# Patient Record
Sex: FEMALE | Race: WHITE | Employment: FULL TIME | URBAN - METROPOLITAN AREA
[De-identification: names, ages, dates, MRNs, and addresses within clinical notes are randomized per-mention and may not be internally consistent; named-entity substitution may affect disease eponyms.]

---

## 2017-04-06 ENCOUNTER — OFFICE VISIT (OUTPATIENT)
Dept: OBGYN CLINIC | Facility: CLINIC | Age: 27
End: 2017-04-06

## 2017-04-06 DIAGNOSIS — Z30.42 ENCOUNTER FOR DEPO-PROVERA CONTRACEPTION: Primary | ICD-10-CM

## 2017-04-06 DIAGNOSIS — Z30.42 ENCOUNTER FOR SURVEILLANCE OF INJECTABLE CONTRACEPTIVE: ICD-10-CM

## 2017-04-06 PROCEDURE — 96372 THER/PROPH/DIAG INJ SC/IM: CPT | Performed by: OBSTETRICS & GYNECOLOGY

## 2017-04-06 RX ORDER — MEDROXYPROGESTERONE ACETATE 150 MG/ML
150 INJECTION, SUSPENSION INTRAMUSCULAR ONCE
Status: COMPLETED | OUTPATIENT
Start: 2017-04-06 | End: 2017-04-06

## 2017-04-06 RX ORDER — MEDROXYPROGESTERONE ACETATE 150 MG/ML
150 INJECTION, SUSPENSION INTRAMUSCULAR ONCE
Status: COMPLETED | OUTPATIENT
Start: 2017-04-06 | End: 2017-12-29

## 2017-04-06 RX ADMIN — MEDROXYPROGESTERONE ACETATE 150 MG: 150 INJECTION, SUSPENSION INTRAMUSCULAR at 10:11:00

## 2017-06-29 RX ORDER — MEDROXYPROGESTERONE ACETATE 150 MG/ML
INJECTION, SUSPENSION INTRAMUSCULAR
Qty: 1 ML | Refills: 0 | Status: SHIPPED | OUTPATIENT
Start: 2017-06-29 | End: 2017-09-29

## 2017-06-30 ENCOUNTER — NURSE ONLY (OUTPATIENT)
Dept: OBGYN CLINIC | Facility: CLINIC | Age: 27
End: 2017-06-30

## 2017-06-30 DIAGNOSIS — Z30.013 ENCOUNTER FOR INITIAL PRESCRIPTION OF INJECTABLE CONTRACEPTIVE: Primary | ICD-10-CM

## 2017-06-30 PROCEDURE — 96372 THER/PROPH/DIAG INJ SC/IM: CPT

## 2017-06-30 RX ORDER — MEDROXYPROGESTERONE ACETATE 150 MG/ML
150 INJECTION, SUSPENSION INTRAMUSCULAR ONCE
Status: COMPLETED | OUTPATIENT
Start: 2017-06-30 | End: 2017-06-30

## 2017-06-30 RX ADMIN — MEDROXYPROGESTERONE ACETATE 150 MG: 150 INJECTION, SUSPENSION INTRAMUSCULAR at 10:53:00

## 2017-09-29 ENCOUNTER — TELEPHONE (OUTPATIENT)
Dept: OBGYN CLINIC | Facility: CLINIC | Age: 27
End: 2017-09-29

## 2017-09-29 RX ORDER — MEDROXYPROGESTERONE ACETATE 150 MG/ML
INJECTION, SUSPENSION INTRAMUSCULAR
Qty: 1 ML | Refills: 0 | Status: SHIPPED | OUTPATIENT
Start: 2017-09-29 | End: 2017-12-28

## 2017-10-02 ENCOUNTER — NURSE ONLY (OUTPATIENT)
Dept: OBGYN CLINIC | Facility: CLINIC | Age: 27
End: 2017-10-02

## 2017-10-02 DIAGNOSIS — Z30.013 ENCOUNTER FOR INITIAL PRESCRIPTION OF INJECTABLE CONTRACEPTIVE: Primary | ICD-10-CM

## 2017-10-02 PROCEDURE — 99212 OFFICE O/P EST SF 10 MIN: CPT | Performed by: OBSTETRICS & GYNECOLOGY

## 2017-10-02 PROCEDURE — 96372 THER/PROPH/DIAG INJ SC/IM: CPT | Performed by: OBSTETRICS & GYNECOLOGY

## 2017-10-02 RX ORDER — MEDROXYPROGESTERONE ACETATE 150 MG/ML
150 INJECTION, SUSPENSION INTRAMUSCULAR ONCE
Status: COMPLETED | OUTPATIENT
Start: 2017-10-02 | End: 2017-10-02

## 2017-10-02 RX ADMIN — MEDROXYPROGESTERONE ACETATE 150 MG: 150 INJECTION, SUSPENSION INTRAMUSCULAR at 10:17:00

## 2017-10-05 ENCOUNTER — OFFICE VISIT (OUTPATIENT)
Dept: OBGYN CLINIC | Facility: CLINIC | Age: 27
End: 2017-10-05

## 2017-10-05 VITALS
HEIGHT: 60 IN | SYSTOLIC BLOOD PRESSURE: 120 MMHG | BODY MASS INDEX: 47.51 KG/M2 | HEART RATE: 80 BPM | RESPIRATION RATE: 18 BRPM | WEIGHT: 242 LBS | DIASTOLIC BLOOD PRESSURE: 88 MMHG

## 2017-10-05 DIAGNOSIS — Z01.419 WELL WOMAN EXAM WITH ROUTINE GYNECOLOGICAL EXAM: Primary | ICD-10-CM

## 2017-10-05 PROCEDURE — 88175 CYTOPATH C/V AUTO FLUID REDO: CPT | Performed by: OBSTETRICS & GYNECOLOGY

## 2017-10-05 PROCEDURE — 99395 PREV VISIT EST AGE 18-39: CPT | Performed by: OBSTETRICS & GYNECOLOGY

## 2017-10-05 NOTE — PROGRESS NOTES
Ophelia Friday is a 31year old female  No LMP recorded. Patient has had an injection. Patient presents with:  Wellness Visit: annual.   .   Occasionally has spotting. Pregnancy was discussed.     OBSTETRICS HISTORY:  OB History    Para Term Pret itching  Skin/Breast:  Denies any breast pain, lumps, or discharge. Neurological:  denies headaches, extremity weakness or numbness.       PHYSICAL EXAM:   Constitutional: well developed, well nourished  Head/Face: normocephalic  Neck/Thyroid: thyroid sym

## 2017-12-28 RX ORDER — MEDROXYPROGESTERONE ACETATE 150 MG/ML
INJECTION, SUSPENSION INTRAMUSCULAR
Qty: 1 ML | Refills: 2 | Status: SHIPPED | OUTPATIENT
Start: 2017-12-28 | End: 2018-03-20

## 2017-12-29 ENCOUNTER — NURSE ONLY (OUTPATIENT)
Dept: OBGYN CLINIC | Facility: CLINIC | Age: 27
End: 2017-12-29

## 2017-12-29 PROCEDURE — 96372 THER/PROPH/DIAG INJ SC/IM: CPT | Performed by: OBSTETRICS & GYNECOLOGY

## 2017-12-29 RX ADMIN — MEDROXYPROGESTERONE ACETATE 150 MG: 150 INJECTION, SUSPENSION INTRAMUSCULAR at 09:42:00

## 2018-03-19 RX ORDER — NITROFURANTOIN 25; 75 MG/1; MG/1
100 CAPSULE ORAL 2 TIMES DAILY
Qty: 14 CAPSULE | Refills: 0 | Status: SHIPPED | OUTPATIENT
Start: 2018-03-19 | End: 2018-03-26

## 2018-03-20 RX ORDER — MEDROXYPROGESTERONE ACETATE 150 MG/ML
150 INJECTION, SUSPENSION INTRAMUSCULAR
Qty: 150 MG | Refills: 0 | Status: SHIPPED
Start: 2018-03-20 | End: 2018-06-17

## 2018-03-20 NOTE — TELEPHONE ENCOUNTER
From: Margie Garcia  Sent: 3/20/2018 11:56 AM CDT  Subject: Medication Renewal Request    Margie Garcia would like a refill of the following medications:     MEDROXYPROGESTERONE ACETATE 150 MG/ML Intramuscular Suspension Diann Sutton MD]    Preferred pha

## 2018-03-23 ENCOUNTER — NURSE ONLY (OUTPATIENT)
Dept: OBGYN CLINIC | Facility: CLINIC | Age: 28
End: 2018-03-23

## 2018-03-23 DIAGNOSIS — Z30.013 ENCOUNTER FOR INITIAL PRESCRIPTION OF INJECTABLE CONTRACEPTIVE: Primary | ICD-10-CM

## 2018-03-23 PROCEDURE — 96372 THER/PROPH/DIAG INJ SC/IM: CPT | Performed by: OBSTETRICS & GYNECOLOGY

## 2018-03-23 PROCEDURE — 99212 OFFICE O/P EST SF 10 MIN: CPT | Performed by: OBSTETRICS & GYNECOLOGY

## 2018-03-23 RX ORDER — MEDROXYPROGESTERONE ACETATE 150 MG/ML
150 INJECTION, SUSPENSION INTRAMUSCULAR ONCE
Status: COMPLETED | OUTPATIENT
Start: 2018-03-23 | End: 2018-03-23

## 2018-03-23 RX ADMIN — MEDROXYPROGESTERONE ACETATE 150 MG: 150 INJECTION, SUSPENSION INTRAMUSCULAR at 13:50:00

## 2018-06-18 RX ORDER — MEDROXYPROGESTERONE ACETATE 150 MG/ML
150 INJECTION, SUSPENSION INTRAMUSCULAR
Qty: 150 MG | Refills: 0 | Status: SHIPPED
Start: 2018-06-18 | End: 2018-09-07

## 2018-06-18 NOTE — TELEPHONE ENCOUNTER
From: Violet Gracia  Sent: 6/17/2018 9:42 PM CDT  Subject: Medication Renewal Request    Violet Graica would like a refill of the following medications:     MedroxyPROGESTERone Acetate (MEDROXYPROGESTERONE ACETATE) 150 MG/ML Intramuscular Suspension Prefil

## 2018-06-19 ENCOUNTER — NURSE ONLY (OUTPATIENT)
Dept: OBGYN CLINIC | Facility: CLINIC | Age: 28
End: 2018-06-19

## 2018-06-19 DIAGNOSIS — Z30.013 ENCOUNTER FOR INITIAL PRESCRIPTION OF INJECTABLE CONTRACEPTIVE: Primary | ICD-10-CM

## 2018-06-19 PROCEDURE — 99212 OFFICE O/P EST SF 10 MIN: CPT | Performed by: OBSTETRICS & GYNECOLOGY

## 2018-06-19 PROCEDURE — 96372 THER/PROPH/DIAG INJ SC/IM: CPT | Performed by: OBSTETRICS & GYNECOLOGY

## 2018-06-19 RX ORDER — MEDROXYPROGESTERONE ACETATE 150 MG/ML
150 INJECTION, SUSPENSION INTRAMUSCULAR ONCE
Status: COMPLETED | OUTPATIENT
Start: 2018-06-19 | End: 2018-06-19

## 2018-06-19 RX ADMIN — MEDROXYPROGESTERONE ACETATE 150 MG: 150 INJECTION, SUSPENSION INTRAMUSCULAR at 12:24:00

## 2018-06-21 RX ORDER — MEDROXYPROGESTERONE ACETATE 150 MG/ML
150 INJECTION, SUSPENSION INTRAMUSCULAR ONCE
Status: SHIPPED | OUTPATIENT
Start: 2018-06-21

## 2018-06-21 NOTE — ADDENDUM NOTE
Addended by: Maira Laws on: 6/21/2018 01:31 PM     Modules accepted: Orders, Level of Service, SmartSet

## 2018-09-08 RX ORDER — MEDROXYPROGESTERONE ACETATE 150 MG/ML
150 INJECTION, SUSPENSION INTRAMUSCULAR
Qty: 150 MG | Refills: 0 | Status: SHIPPED
Start: 2018-09-08 | End: 2018-12-07

## 2018-09-08 NOTE — TELEPHONE ENCOUNTER
From: Nicolasa Maciel  Sent: 9/7/2018 7:08 PM CDT  Subject: Medication Renewal Request    Nicolasa Maciel would like a refill of the following medications:     MedroxyPROGESTERone Acetate (MEDROXYPROGESTERONE ACETATE) 150 MG/ML Intramuscular Suspension Prefill

## 2018-09-11 ENCOUNTER — NURSE ONLY (OUTPATIENT)
Dept: OBGYN CLINIC | Facility: CLINIC | Age: 28
End: 2018-09-11
Payer: COMMERCIAL

## 2018-09-11 ENCOUNTER — TELEPHONE (OUTPATIENT)
Dept: OBGYN CLINIC | Facility: CLINIC | Age: 28
End: 2018-09-11

## 2018-09-11 DIAGNOSIS — Z30.42 ENCOUNTER FOR SURVEILLANCE OF INJECTABLE CONTRACEPTIVE: Primary | ICD-10-CM

## 2018-09-11 PROCEDURE — 96372 THER/PROPH/DIAG INJ SC/IM: CPT | Performed by: OBSTETRICS & GYNECOLOGY

## 2018-09-11 RX ORDER — MEDROXYPROGESTERONE ACETATE 150 MG/ML
150 INJECTION, SUSPENSION INTRAMUSCULAR ONCE
Status: COMPLETED | OUTPATIENT
Start: 2018-09-11 | End: 2018-09-11

## 2018-09-11 RX ADMIN — MEDROXYPROGESTERONE ACETATE 150 MG: 150 INJECTION, SUSPENSION INTRAMUSCULAR at 09:58:00

## 2018-09-11 NOTE — TELEPHONE ENCOUNTER
Per automatic system for [facility] ppo [facility] pt is active since 08- with a [employer] select preferred plan. No reference # for this call. Group # is [id number] and Id # is [id number].  Thanks

## 2018-12-03 RX ORDER — MEDROXYPROGESTERONE ACETATE 150 MG/ML
150 INJECTION, SUSPENSION INTRAMUSCULAR
Qty: 150 MG | Refills: 0 | OUTPATIENT
Start: 2018-12-03 | End: 2019-03-03

## 2019-10-29 ENCOUNTER — OFFICE VISIT (OUTPATIENT)
Dept: FAMILY MEDICINE CLINIC | Facility: CLINIC | Age: 29
End: 2019-10-29
Payer: COMMERCIAL

## 2019-10-29 VITALS
OXYGEN SATURATION: 98 % | BODY MASS INDEX: 45.55 KG/M2 | SYSTOLIC BLOOD PRESSURE: 138 MMHG | WEIGHT: 232 LBS | HEIGHT: 60 IN | DIASTOLIC BLOOD PRESSURE: 80 MMHG | TEMPERATURE: 99 F | HEART RATE: 90 BPM | RESPIRATION RATE: 22 BRPM

## 2019-10-29 DIAGNOSIS — R11.0 NAUSEA: Primary | ICD-10-CM

## 2019-10-29 DIAGNOSIS — K52.9 ACUTE GASTROENTERITIS: ICD-10-CM

## 2019-10-29 PROCEDURE — 99202 OFFICE O/P NEW SF 15 MIN: CPT | Performed by: NURSE PRACTITIONER

## 2019-10-29 PROCEDURE — 81025 URINE PREGNANCY TEST: CPT | Performed by: NURSE PRACTITIONER

## 2019-10-29 RX ORDER — ONDANSETRON 4 MG/1
4 TABLET, ORALLY DISINTEGRATING ORAL EVERY 8 HOURS PRN
Qty: 6 TABLET | Refills: 0 | Status: SHIPPED | OUTPATIENT
Start: 2019-10-29

## 2019-10-30 NOTE — PROGRESS NOTES
CHIEF COMPLAINT:   Patient presents with:  Pregnancy: Morning sickness throughout the day, chills, bodyaches, X 4 days, patient states possible pregancy       HPI:   Zainab Ricardo is a 33year old female who presents for complaints of nausea, chills, bod bilaterally  NOSE: nostrils patent. Nasal mucosa pink and without exudates. THROAT: oral mucosa pink, moist. Posterior pharynx is not erythematous. No exudates. NECK: supple,no adenopathy  LUNGS: clear to auscultation bilaterally.  No wheezing, rales, or

## 2025-03-11 ENCOUNTER — HOSPITAL ENCOUNTER (OUTPATIENT)
Facility: HOSPITAL | Age: 35
Discharge: HOME | End: 2025-03-12
Attending: EMERGENCY MEDICINE | Admitting: STUDENT IN AN ORGANIZED HEALTH CARE EDUCATION/TRAINING PROGRAM
Payer: COMMERCIAL

## 2025-03-11 ENCOUNTER — APPOINTMENT (OUTPATIENT)
Dept: CT IMAGING | Age: 35
End: 2025-03-11
Attending: EMERGENCY MEDICINE
Payer: COMMERCIAL

## 2025-03-11 ENCOUNTER — APPOINTMENT (OUTPATIENT)
Dept: GENERAL RADIOLOGY | Age: 35
End: 2025-03-11
Payer: COMMERCIAL

## 2025-03-11 DIAGNOSIS — R94.31 ECG ABNORMALITY: ICD-10-CM

## 2025-03-11 DIAGNOSIS — R07.9 CHEST PAIN WITH MODERATE RISK FOR CARDIAC ETIOLOGY: Primary | ICD-10-CM

## 2025-03-11 PROBLEM — E87.6 HYPOKALEMIA: Status: ACTIVE | Noted: 2025-03-11

## 2025-03-11 LAB
ALBUMIN SERPL-MCNC: 4.1 G/DL (ref 3.2–4.8)
ALBUMIN/GLOB SERPL: 1.3 {RATIO} (ref 1–2)
ALP LIVER SERPL-CCNC: 67 U/L
ALT SERPL-CCNC: 18 U/L
ANION GAP SERPL CALC-SCNC: 8 MMOL/L (ref 0–18)
AST SERPL-CCNC: 20 U/L (ref ?–34)
BASOPHILS # BLD AUTO: 0.06 X10(3) UL (ref 0–0.2)
BASOPHILS NFR BLD AUTO: 0.9 %
BILIRUB SERPL-MCNC: 0.2 MG/DL (ref 0.3–1.2)
BUN BLD-MCNC: 12 MG/DL (ref 9–23)
CALCIUM BLD-MCNC: 9.3 MG/DL (ref 8.7–10.6)
CHLORIDE SERPL-SCNC: 105 MMOL/L (ref 98–112)
CO2 SERPL-SCNC: 24 MMOL/L (ref 21–32)
CREAT BLD-MCNC: 0.74 MG/DL
EGFRCR SERPLBLD CKD-EPI 2021: 108 ML/MIN/1.73M2 (ref 60–?)
EOSINOPHIL # BLD AUTO: 0.08 X10(3) UL (ref 0–0.7)
EOSINOPHIL NFR BLD AUTO: 1.2 %
ERYTHROCYTE [DISTWIDTH] IN BLOOD BY AUTOMATED COUNT: 13.7 %
GLOBULIN PLAS-MCNC: 3.2 G/DL (ref 2–3.5)
GLUCOSE BLD-MCNC: 107 MG/DL (ref 70–99)
HCT VFR BLD AUTO: 37.8 %
HGB BLD-MCNC: 12.4 G/DL
IMM GRANULOCYTES # BLD AUTO: 0.02 X10(3) UL (ref 0–1)
IMM GRANULOCYTES NFR BLD: 0.3 %
LYMPHOCYTES # BLD AUTO: 2.66 X10(3) UL (ref 1–4)
LYMPHOCYTES NFR BLD AUTO: 38.4 %
MCH RBC QN AUTO: 27.9 PG (ref 26–34)
MCHC RBC AUTO-ENTMCNC: 32.8 G/DL (ref 31–37)
MCV RBC AUTO: 84.9 FL
MONOCYTES # BLD AUTO: 0.52 X10(3) UL (ref 0.1–1)
MONOCYTES NFR BLD AUTO: 7.5 %
NEUTROPHILS # BLD AUTO: 3.58 X10 (3) UL (ref 1.5–7.7)
NEUTROPHILS # BLD AUTO: 3.58 X10(3) UL (ref 1.5–7.7)
NEUTROPHILS NFR BLD AUTO: 51.7 %
OSMOLALITY SERPL CALC.SUM OF ELEC: 284 MOSM/KG (ref 275–295)
PLATELET # BLD AUTO: 356 10(3)UL (ref 150–450)
POTASSIUM SERPL-SCNC: 3.4 MMOL/L (ref 3.5–5.1)
PROT SERPL-MCNC: 7.3 G/DL (ref 5.7–8.2)
RBC # BLD AUTO: 4.45 X10(6)UL
SODIUM SERPL-SCNC: 137 MMOL/L (ref 136–145)
TROPONIN I BLD-MCNC: <0.02 NG/ML
TROPONIN I SERPL HS-MCNC: 3 NG/L
TROPONIN I SERPL HS-MCNC: 3 NG/L
WBC # BLD AUTO: 6.9 X10(3) UL (ref 4–11)

## 2025-03-11 PROCEDURE — 71275 CT ANGIOGRAPHY CHEST: CPT | Performed by: EMERGENCY MEDICINE

## 2025-03-11 PROCEDURE — 71045 X-RAY EXAM CHEST 1 VIEW: CPT

## 2025-03-11 PROCEDURE — 99223 1ST HOSP IP/OBS HIGH 75: CPT | Performed by: HOSPITALIST

## 2025-03-11 RX ORDER — ACETAMINOPHEN 500 MG
500 TABLET ORAL EVERY 4 HOURS PRN
Status: DISCONTINUED | OUTPATIENT
Start: 2025-03-11 | End: 2025-03-12

## 2025-03-11 RX ORDER — BISACODYL 10 MG
10 SUPPOSITORY, RECTAL RECTAL
Status: DISCONTINUED | OUTPATIENT
Start: 2025-03-11 | End: 2025-03-12

## 2025-03-11 RX ORDER — POLYETHYLENE GLYCOL 3350 17 G/17G
17 POWDER, FOR SOLUTION ORAL DAILY PRN
Status: DISCONTINUED | OUTPATIENT
Start: 2025-03-11 | End: 2025-03-12

## 2025-03-11 RX ORDER — KETOROLAC TROMETHAMINE 30 MG/ML
30 INJECTION, SOLUTION INTRAMUSCULAR; INTRAVENOUS ONCE
Status: COMPLETED | OUTPATIENT
Start: 2025-03-11 | End: 2025-03-11

## 2025-03-11 RX ORDER — SENNOSIDES 8.6 MG
17.2 TABLET ORAL NIGHTLY PRN
Status: DISCONTINUED | OUTPATIENT
Start: 2025-03-11 | End: 2025-03-12

## 2025-03-11 RX ORDER — PROCHLORPERAZINE EDISYLATE 5 MG/ML
5 INJECTION INTRAMUSCULAR; INTRAVENOUS EVERY 8 HOURS PRN
Status: DISCONTINUED | OUTPATIENT
Start: 2025-03-11 | End: 2025-03-12

## 2025-03-11 RX ORDER — SODIUM PHOSPHATE, DIBASIC AND SODIUM PHOSPHATE, MONOBASIC 7; 19 G/230ML; G/230ML
1 ENEMA RECTAL ONCE AS NEEDED
Status: DISCONTINUED | OUTPATIENT
Start: 2025-03-11 | End: 2025-03-12

## 2025-03-11 RX ORDER — ENOXAPARIN SODIUM 100 MG/ML
0.5 INJECTION SUBCUTANEOUS EVERY 12 HOURS SCHEDULED
Status: DISCONTINUED | OUTPATIENT
Start: 2025-03-12 | End: 2025-03-12

## 2025-03-11 RX ORDER — ENOXAPARIN SODIUM 100 MG/ML
40 INJECTION SUBCUTANEOUS DAILY
Status: DISCONTINUED | OUTPATIENT
Start: 2025-03-12 | End: 2025-03-11

## 2025-03-11 RX ORDER — ASPIRIN 81 MG/1
324 TABLET, CHEWABLE ORAL ONCE
Status: COMPLETED | OUTPATIENT
Start: 2025-03-11 | End: 2025-03-11

## 2025-03-11 RX ORDER — POTASSIUM CHLORIDE 1500 MG/1
40 TABLET, EXTENDED RELEASE ORAL EVERY 4 HOURS
Status: COMPLETED | OUTPATIENT
Start: 2025-03-11 | End: 2025-03-12

## 2025-03-11 RX ORDER — ONDANSETRON 2 MG/ML
4 INJECTION INTRAMUSCULAR; INTRAVENOUS EVERY 6 HOURS PRN
Status: DISCONTINUED | OUTPATIENT
Start: 2025-03-11 | End: 2025-03-12

## 2025-03-11 RX ORDER — KETOROLAC TROMETHAMINE 30 MG/ML
30 INJECTION, SOLUTION INTRAMUSCULAR; INTRAVENOUS EVERY 6 HOURS PRN
Status: DISCONTINUED | OUTPATIENT
Start: 2025-03-11 | End: 2025-03-12

## 2025-03-11 NOTE — ED PROVIDER NOTES
Patient Seen in: [facility] [place]      History     Chief Complaint   Patient presents with    Chest Pain     Stated Complaint: left sided chest pain since 7am. has not taken anything for pain    Subjective:     HPI    34-year-old woman who reports experiencing chest pain that began upon waking this morning, March 11, 2025. Initially, the pain was mild and felt more in the left arm, leading to the assumption that it might be due to sleeping in an awkward position. However, as the day progressed, the pain intensified significantly around 2 hours PTA, becoming severe enough to cause crying and difficulty breathing. The pain is described as waxing and waning in intensity.  It is not associated with any physical activity like lifting. There is no history of similar pain in the past, and the patient denies symptoms such as sweating, nausea, leg swelling, or tenderness. There is a family history of a grandfather who had a massive heart attack in his 50s.    Objective:   Past Medical History:    ASCUS of cervix with negative high risk HPV    Obesity, unspecified    Pap smear for cervical cancer screening    negative hpv negative              History reviewed. No pertinent surgical history.             Social History     Socioeconomic History    Marital status: Single   Tobacco Use    Smoking status: Never    Smokeless tobacco: Never   Vaping Use    Vaping status: Never Used   Substance and Sexual Activity    Alcohol use: Never    Drug use: Never              Review of Systems    Positive for stated complaint: left sided chest pain since 7am. has not taken anything for pain  Other systems are as noted in HPI.  Constitutional and vital signs reviewed.      All other systems reviewed and negative except as noted above.    Physical Exam     ED Triage Vitals [03/11/25 1550]   BP (!) 135/91   Pulse 94   Resp 16   Temp 98.2 °F (36.8 °C)   Temp src Oral   SpO2 100 %   O2 Device None (Room air)        Current:/84   Pulse 75   Temp 97.9 °F (36.6 °C)   Resp 16   Ht 152.4 cm (5')   Wt 108.9 kg   LMP 03/04/2025 (Approximate)   SpO2 100%   BMI 46.87 kg/m²     General:  Vitals as listed.  No acute distress   HEENT: Sclerae anicteric.  Conjunctivae show no pallor.  Oropharynx clear, mucous membranes moist   Lungs: good air exchange and clear   Heart: regular rate rhythm and no murmur  Chest wall: Patient has minimal tenderness in the left upper parasternal area that partially reproduces her pain.  No calf tenderness or lower extremity symmetry  Extremities: no edema, normal peripheral pulses   Neuro: Alert oriented and nonfocal      ED Course     Labs Reviewed   COMP METABOLIC PANEL (14) - Abnormal; Notable for the following components:       Result Value    Glucose 107 (*)     Potassium 3.4 (*)     Bilirubin, Total 0.2 (*)     All other components within normal limits   TROPONIN I HIGH SENSITIVITY - Normal   ISTAT TROPONIN - Normal   CBC WITH DIFFERENTIAL WITH PLATELET   TROPONIN I HIGH SENSITIVITY   RAINBOW DRAW BLUE     CTA CHEST (CPT=71275)    Result Date: 3/11/2025  CONCLUSION:  There is no pulmonary embolism to the first subsegmental arterial level.  If clinical symptoms persist then recommend follow-up imaging.    LOCATION:  [facility]   Dictated by (CST): Xiang Kelly MD on 3/11/2025 at 6:19 PM     Finalized by (CST): Xiang Kelly MD on 3/11/2025 at 6:21 PM       XR CHEST AP/PA (1 VIEW) (CPT=71045)    Result Date: 3/11/2025  CONCLUSION:  There is no evidence of active cardiopulmonary disease.   LOCATION:  [facility]      Dictated by (CST): Fausitno Smith MD on 3/11/2025 at 4:48 PM     Finalized by (CST): Faustino Smith MD on 3/11/2025 at 4:48 PM      I independently read the radiographs and no pulmonary edema on chest x-ray  EKG #1 at 1500  Rate, intervals and axes as noted on EKG Report.  Rate: 83  Rhythm: Sinus Rhythm  Reading: T wave inversions across lateral V leads with some coving         EKG #2 at  1857  Rate, intervals and axes as noted on EKG Report.  Rate: 77  Rhythm: Sinus Rhythm  Reading: T wave inversion noted in V3 and overall ST-T changes look improved compared to EKG done earlier today    ED COURSE and MDM     Differential diagnosis before testing included atypical chest pain versus myocardial ischemia, a medical condition that poses a threat to life.    I reviewed prior external notes including evaluation by primary care on 12/28/2021    Heart Score:    HEART Score      Title      Criteria Score   Age: <45 Age Score: 0   History: Mod Suspicious Hx Score: 1     EKG: Signif ST Deviation EKG Score: 2   HTN: No   Hypercholesterolemia: No   Atherosclerosis/PVD: No     DM: No   BMI>30kg/m2: Yes   Smoking: No   Family History: Yes         Other Risk Factor Score: 2             Lab Results   Component Value Date    TROPHS 3 03/11/2025           HEART Score: 4        Risk of adverse cardiac event is 12-16.6%          [facility] cardiology contacted and they recommend admitting the patient to the hospital for further workup/stress testing.    [facility].    Patient given aspirin.    I have discussed with the patient the results of testing, differential diagnosis, and treatment plan. They expressed clear understanding of these instructions and agrees to the plan provided.    Disposition and Plan     Clinical Impression:  1. Chest pain with moderate risk for cardiac etiology    2. ECG abnormality         Disposition:  Admit  3/11/2025  7:53 pm    Follow-up:  No follow-up provider specified.      Medications Prescribed:  Current Discharge Medication List

## 2025-03-11 NOTE — ED QUICK NOTES
To ER for eval of anterior chest pain with radiation to the left arm,that started this morning. She denies any injury or unusual activity. The pt is taking deep breaths,in order to decrease the pain. The lungs ar clear to ausc. The monitor was applied with NSR noted. A 20g Heplock was inserted to the left forearm and the pt will be medicated as ordered for pain. She is aware of the need for a c/t chest to r/o a PE and is agreeable to it.

## 2025-03-11 NOTE — ED INITIAL ASSESSMENT (HPI)
Pt reports intermittent left sided chest pain that radiates to her left arm. Reports this started at 0700.

## 2025-03-11 NOTE — ED QUICK NOTES
The pt completed her c/t scan and returned to the ER. A repeat Troponin was drawn and sent and a repeat 12 lead EKG is being done. The pt feels better after being medicated with Toradol.

## 2025-03-12 ENCOUNTER — APPOINTMENT (OUTPATIENT)
Dept: CV DIAGNOSTICS | Facility: HOSPITAL | Age: 35
End: 2025-03-12
Attending: HOSPITALIST
Payer: COMMERCIAL

## 2025-03-12 ENCOUNTER — APPOINTMENT (OUTPATIENT)
Dept: CV DIAGNOSTICS | Facility: HOSPITAL | Age: 35
End: 2025-03-12
Payer: COMMERCIAL

## 2025-03-12 VITALS
RESPIRATION RATE: 16 BRPM | WEIGHT: 237.44 LBS | HEIGHT: 60 IN | DIASTOLIC BLOOD PRESSURE: 71 MMHG | OXYGEN SATURATION: 97 % | TEMPERATURE: 99 F | BODY MASS INDEX: 46.62 KG/M2 | HEART RATE: 79 BPM | SYSTOLIC BLOOD PRESSURE: 118 MMHG

## 2025-03-12 PROBLEM — E66.01 MORBID OBESITY WITH BMI OF 45.0-49.9, ADULT (HCC): Chronic | Status: ACTIVE | Noted: 2025-03-12

## 2025-03-12 LAB
ANION GAP SERPL CALC-SCNC: 6 MMOL/L (ref 0–18)
ATRIAL RATE: 77 BPM
ATRIAL RATE: 83 BPM
BASOPHILS # BLD AUTO: 0.04 X10(3) UL (ref 0–0.2)
BASOPHILS NFR BLD AUTO: 0.8 %
BUN BLD-MCNC: 10 MG/DL (ref 9–23)
CALCIUM BLD-MCNC: 9.2 MG/DL (ref 8.7–10.6)
CHLORIDE SERPL-SCNC: 106 MMOL/L (ref 98–112)
CO2 SERPL-SCNC: 28 MMOL/L (ref 21–32)
CREAT BLD-MCNC: 0.76 MG/DL
CRP SERPL-MCNC: <0.4 MG/DL (ref ?–0.5)
EGFRCR SERPLBLD CKD-EPI 2021: 105 ML/MIN/1.73M2 (ref 60–?)
EOSINOPHIL # BLD AUTO: 0.07 X10(3) UL (ref 0–0.7)
EOSINOPHIL NFR BLD AUTO: 1.3 %
ERYTHROCYTE [DISTWIDTH] IN BLOOD BY AUTOMATED COUNT: 13.6 %
ERYTHROCYTE [SEDIMENTATION RATE] IN BLOOD: 28 MM/HR
GLUCOSE BLD-MCNC: 90 MG/DL (ref 70–99)
HCT VFR BLD AUTO: 37.5 %
HGB BLD-MCNC: 11.8 G/DL
IMM GRANULOCYTES # BLD AUTO: 0.01 X10(3) UL (ref 0–1)
IMM GRANULOCYTES NFR BLD: 0.2 %
LYMPHOCYTES # BLD AUTO: 1.96 X10(3) UL (ref 1–4)
LYMPHOCYTES NFR BLD AUTO: 37.7 %
MCH RBC QN AUTO: 26.9 PG (ref 26–34)
MCHC RBC AUTO-ENTMCNC: 31.5 G/DL (ref 31–37)
MCV RBC AUTO: 85.6 FL
MONOCYTES # BLD AUTO: 0.43 X10(3) UL (ref 0.1–1)
MONOCYTES NFR BLD AUTO: 8.3 %
NEUTROPHILS # BLD AUTO: 2.69 X10 (3) UL (ref 1.5–7.7)
NEUTROPHILS # BLD AUTO: 2.69 X10(3) UL (ref 1.5–7.7)
NEUTROPHILS NFR BLD AUTO: 51.7 %
OSMOLALITY SERPL CALC.SUM OF ELEC: 289 MOSM/KG (ref 275–295)
P AXIS: 24 DEGREES
P AXIS: 9 DEGREES
P-R INTERVAL: 116 MS
P-R INTERVAL: 124 MS
PLATELET # BLD AUTO: 383 10(3)UL (ref 150–450)
POTASSIUM SERPL-SCNC: 4.6 MMOL/L (ref 3.5–5.1)
POTASSIUM SERPL-SCNC: 4.6 MMOL/L (ref 3.5–5.1)
Q-T INTERVAL: 378 MS
Q-T INTERVAL: 378 MS
QRS DURATION: 88 MS
QRS DURATION: 90 MS
QTC CALCULATION (BEZET): 427 MS
QTC CALCULATION (BEZET): 444 MS
R AXIS: 16 DEGREES
R AXIS: 28 DEGREES
RBC # BLD AUTO: 4.38 X10(6)UL
SODIUM SERPL-SCNC: 140 MMOL/L (ref 136–145)
T AXIS: 11 DEGREES
T AXIS: 8 DEGREES
VENTRICULAR RATE: 77 BPM
VENTRICULAR RATE: 83 BPM
WBC # BLD AUTO: 5.2 X10(3) UL (ref 4–11)

## 2025-03-12 PROCEDURE — 99238 HOSP IP/OBS DSCHRG MGMT 30/<: CPT | Performed by: HOSPITALIST

## 2025-03-12 PROCEDURE — 93350 STRESS TTE ONLY: CPT

## 2025-03-12 PROCEDURE — 93306 TTE W/DOPPLER COMPLETE: CPT | Performed by: HOSPITALIST

## 2025-03-12 PROCEDURE — 93017 CV STRESS TEST TRACING ONLY: CPT

## 2025-03-12 PROCEDURE — 93018 CV STRESS TEST I&R ONLY: CPT

## 2025-03-12 NOTE — ED QUICK NOTES
Report was given to the medics and the pt was sent to room 0023 per ALS protocol. She was tearful earlier,after her daughter left. She verbalized that  her pain is a \"6-7\". Her BP has decreased sl before being transferred.

## 2025-03-12 NOTE — CONSULTS
Cardiology Consultation      Radha Ruiz Patient Status:  Observation    1990 MRN [id number]   Location [facility] 0SW-A Attending Brie Michael MD   Hosp Day # 0 PCP Anny Roblero MD     Reason for Consultation:  Chest pain    History of Present Illness:  Radha Ruiz is a(n) 34 year old female with chronic medical conditions including obesity who presents with complaint of chest discomfort. Symptoms started when she first woke up in the AM yesterday. Described as tightness/sharp discomfort in left anterior chest with numbness going down left arm into her fingers. Denies worsening with exertion or improvement with rest. Notes that when she was in the CT scanner, when asked to put her arms above her head, that made the pain worse. Appears to have a positional component. Denies palpitations, diaphoresis, nausea, emesis. Denies premature cad hx in her parents - grandfather had mi in his 40's. Denies tobacco use, illicit drug use, etoh use. This morning, pain has notably improved after receiving toradol yesterday evening.     History:  Past Medical History:    ASCUS of cervix with negative high risk HPV    Obesity, unspecified    Pap smear for cervical cancer screening    negative hpv negative     History reviewed. No pertinent surgical history.  Family History   Problem Relation Age of Onset    Hypertension Mother     Hypertension Maternal Grandmother       reports that she has never smoked. She has never used smokeless tobacco. She reports that she does not drink alcohol and does not use drugs.    Allergies:  Allergies[1]    Medications:    Current Facility-Administered Medications:     melatonin tab 3 mg, 3 mg, Oral, Nightly PRN    acetaminophen (Tylenol Extra Strength) tab 500 mg, 500 mg, Oral, Q4H PRN    ondansetron (Zofran) 4 MG/2ML injection 4 mg, 4 mg, Intravenous, Q6H PRN    prochlorperazine (Compazine) 10 MG/2ML injection 5 mg, 5 mg, Intravenous, Q8H PRN    polyethylene glycol (PEG 3350)  (Miralax) 17 g oral packet 17 g, 17 g, Oral, Daily PRN    sennosides (Senokot) tab 17.2 mg, 17.2 mg, Oral, Nightly PRN    bisacodyl (Dulcolax) 10 MG rectal suppository 10 mg, 10 mg, Rectal, Daily PRN    fleet enema (Fleet) rectal enema 133 mL, 1 enema, Rectal, Once PRN    enoxaparin (Lovenox) 60 MG/0.6ML SUBQ injection 50 mg, 0.5 mg/kg, Subcutaneous, 2 times per day    ketorolac (Toradol) 30 MG/ML injection 30 mg, 30 mg, Intravenous, Q6H PRN    Review of Systems:  A comprehensive review of systems was negative if not otherwise mention in above HPI.    /79 (BP Location: Right arm)   Pulse 79   Temp 98 °F (36.7 °C) (Oral)   Resp 16   Ht 5' (1.524 m)   Wt 237 lb 7 oz (107.7 kg)   LMP 2025 (Approximate)   SpO2 97%   BMI 46.37 kg/m²   Temp (24hrs), Av.1 °F (36.7 °C), Min:97.7 °F (36.5 °C), Max:98.3 °F (36.8 °C)       Intake/Output Summary (Last 24 hours) at 3/12/2025 0823  Last data filed at 3/11/2025 2300  Gross per 24 hour   Intake --   Output 200 ml   Net -200 ml     Wt Readings from Last 3 Encounters:   25 237 lb 7 oz (107.7 kg)   10/29/19 232 lb (105.2 kg)   10/05/17 242 lb (109.8 kg)       Physical Exam:   General: Alert and oriented x 3. No apparent distress. No respiratory or constitutional distress.  HEENT: Normocephalic, anicteric sclera, neck supple.  Neck: No JVD  Cardiac: Regular rate and rhythm. S1, S2 normal. No murmur, pericardial rub, S3.  Lungs: Clear without wheezes, rales, rhonchi or dullness.  Normal excursions and effort. Left anterior chest wall painful to palpation, similar in character to baseline symptoms.   Abdomen: Soft, non-tender. BS-present.  Extremities: Without clubbing, cyanosis or edema.  Peripheral pulses are 2+.  Neurologic: Alert and oriented, normal affect.  Skin: Warm and dry.     Laboratory Data:  Lab Results   Component Value Date    WBC 5.2 2025    HGB 11.8 2025    HCT 37.5 2025    .0 2025    CREATSERUM 0.76 2025     BUN 10 03/12/2025     03/12/2025    K 4.6 03/12/2025    K 4.6 03/12/2025     03/12/2025    CO2 28.0 03/12/2025    GLU 90 03/12/2025    CA 9.2 03/12/2025    ALB 4.1 03/11/2025    ALKPHO 67 03/11/2025    BILT 0.2 03/11/2025    TP 7.3 03/11/2025    AST 20 03/11/2025    ALT 18 03/11/2025       Imaging/results:  EKG - NSR - TWI ant leads - no prior ekgs for comparison   Troponin - 3, 3   CTA chest - no prox pe.       Assessment:  Chest pain, suspect MSK   Obesity      Plan:  ECHO   Exercise stress echo    Check esr/crp  Further recs to follow         Thank you for allowing me to participate in the care of your patient.      Roberto Resendiz DO  Cardiologist  [facility]  3/12/2025 8:23 AM      Note to the patient: The 21st Century Cures Act makes medical notes like these available to patients in the interest of transparency. However, be advised that this is a medical document. It is intended as peer to peer communication. It is written in medical language and may contain abbreviations or verbiage that are unfamiliar. It may appear blunt or direct. Medical documents are intended to carry relevant information, facts as evident, and clinical opinion of the practitioner.     Disclaimer: Components of this note were documented using voice recognition system and are subject to errors not corrected at proofreading. Contact the author of this note for any clarifications.          [1] No Known Allergies

## 2025-03-12 NOTE — ED QUICK NOTES
Orders for admission, patient is aware of plan and ready to go upstairs. Any questions, please call ED RN Keely at extension [phone].     Patient Covid vaccination status: Unvaccinated     COVID Test Ordered in ED: None    COVID Suspicion at Admission: N/A    Running Infusions:  None    Mental Status/LOC at time of transport: a/ox4    Other pertinent information:   CIWA score: N/A   NIH score:  N/A

## 2025-03-12 NOTE — H&P
[facility]     Radha Ruiz Patient Status:  Observation    1990 MRN [id number]   Location [facility] 0SW-A Attending Tamia Corbin MD   Hosp Day # 0 PCP Anny Roblero MD     Chief Complaint: chest pain    Subjective:    History of Present Illness:     Radha Ruiz is a 34 year old female with past medical history presents the emergency room with chest pain that started this morning when she woke up.  Was initially mild radiating to the left arm.  Patient initially thought she just slept in a weird position however as the day went on the pain intensified.  Patient reports feeling short of breath but denies any sweating.  No nausea, vomiting.  No exacerbating factors or remitting factors.  Patient does have a grandfather who had a heart attack in his 50s.    History/Other:    Past Medical History:  Past Medical History:    ASCUS of cervix with negative high risk HPV    Obesity, unspecified    Pap smear for cervical cancer screening    negative hpv negative     Past Surgical History:   History reviewed. No pertinent surgical history.   Family History:   Family History   Problem Relation Age of Onset    Hypertension Mother     Hypertension Maternal Grandmother      Social History:    reports that she has never smoked. She has never used smokeless tobacco. She reports that she does not drink alcohol and does not use drugs.     Allergies: Allergies[1]    Medications:  Medications Ordered Prior to Encounter[2]    Review of Systems:   A comprehensive review of systems was completed.    Pertinent positives and negatives noted in the HPI.    Objective:   Physical Exam:    /82   Pulse 80   Temp 98.2 °F (36.8 °C) (Oral)   Resp 16   Ht 5' (1.524 m)   Wt 240 lb (108.9 kg)   LMP 2025 (Approximate)   SpO2 97%   BMI 46.87 kg/m²   General: No acute distress, Alert  Respiratory: No rhonchi, no wheezes  Cardiovascular: S1, S2. Regular rate and rhythm  Abdomen: Soft,  Non-tender, non-distended, positive bowel sounds  Neuro: No new focal deficits  Extremities: No edema      Results:    Labs:      Labs Last 24 Hours:    Recent Labs   Lab 03/11/25  1607   RBC 4.45   HGB 12.4   HCT 37.8   MCV 84.9   MCH 27.9   MCHC 32.8   RDW 13.7   NEPRELIM 3.58   WBC 6.9   .0       Recent Labs   Lab 03/11/25  1607   *   BUN 12   CREATSERUM 0.74   EGFRCR 108   CA 9.3   ALB 4.1      K 3.4*      CO2 24.0   ALKPHO 67   AST 20   ALT 18   BILT 0.2*   TP 7.3       Estimated Glomerular Filtration Rate: 108 mL/min/1.73m2 (result from lab).    No results found for: \"PT\", \"INR\"    Recent Labs   Lab 03/11/25  1607 03/11/25  1840   TROPHS 3 3       No results for input(s): \"TROP\", \"PBNP\" in the last 168 hours.    No results for input(s): \"PCT\" in the last 168 hours.    Imaging: Imaging data reviewed in Epic.    Assessment & Plan:      # Chest pain  - will rule out ACS with repeat troponin  - Echo in the am  - Cardiology consulted for stress test.    # Hypokalemia  - replace per protocol.    # Morbid obesity  - BMI is 46.87.    Plan of care discussed with patient at bedside    Gurvinder Capellan,     Supplementary Documentation:     The 21st Century Cures Act makes medical notes like these available to patients in the interest of transparency. Please be advised this is a medical document. Medical documents are intended to carry relevant information, facts as evident, and the clinical opinion of the practitioner. The medical note is intended as peer to peer communication and may appear blunt or direct. It is written in medical language and may contain abbreviations or verbiage that are unfamiliar.            [1] No Known Allergies  [2]   No current facility-administered medications on file prior to encounter.     Current Outpatient Medications on File Prior to Encounter   Medication Sig Dispense Refill    ondansetron 4 MG Oral Tablet Dispersible Take 1 tablet (4 mg total) by  mouth every 8 (eight) hours as needed for Nausea. (Patient not taking: Reported on 3/11/2025) 6 tablet 0

## 2025-03-12 NOTE — PLAN OF CARE
Assumed care 0730.  Left arm pain.  NPO   PIV saline locked.   RA  Echo to be completed  Stress test to be completed  Cardiac diet  Afebrile  Electrolyte Cardiac protocol   Lovenox for VTE prophylaxis.   Up at raoul

## 2025-03-12 NOTE — PLAN OF CARE
Radha Ruiz Patient Status:  Observation    1990 MRN [id number]   Location [facility] 0SW-A Attending Tamia Corbin MD   Hosp Day # 0 PCP Anny Roblero MD       Cardiology Nocturnal APN Note    Page Received: Dr. Brewer, ED Physician    HPI:     Patient is a 34 year old female with no significant PMH who presented to the ED with c/o chest pain. Pt reported chest pain stated when when she woke up. Pt reported chest pain worsened through out the course of the day. Pt stated pain was also in her left arm. Pt also reported having some dyspnea but denied any other associated symptoms. Cardiac work up in ED showed EKG with I wave inversion but no other changes. Troponin was negative. [facility] consulted for chest pain. EKG and plan of care reviewed with on call cardiologist (Dr. Meadows). No previous cardiac testing available for review. HPI obtained from chart review and information provided by ED physician.       ED Clinical Course    EKG: As noted above in HPI    Labs: Troponin negative, K+ 3.4    Imaging: CXR unremarkable  CTA chest negative for PE> No acute findings    Medications: Aspirin 324 mg po, PO potassium        Assessment/Plan:    - Troponin negative x 2  - 2D echocardiogram pending  - Continue to monitor overnight on telemetry  - Formal cardiology consult to follow in AM.       LOKI Mojica  [facility]  3/12/2025  3:58 AM

## 2025-03-12 NOTE — PLAN OF CARE
Stress echocardiogram is reassuring and stress EKG negative for ischemia. Patient exercised at 89% APMHR. 5.8 METs achieved.     Resting echocardiogram is reassuring with LVEF 55-60%, no significant valvular heart disease.    Stable for discharge from cardiac standpoint. Will arrange follow-up in a couple weeks with Dr Resendiz.     Marlin Funk PA-C

## 2025-03-12 NOTE — PLAN OF CARE
Pt received from [facility] ED. Pt here with chest pain and L arm pain/numbness in the hand. Pt received around 2240. Hospitalist notified. Med rec and admission docs completed. Alert and oriented. RA. On continuous tele. NSR on tele. Denies SOB/dizziness/nausea. Ambulated independently to restroom. Pain noted. PRN meds given as ordered. Per pt last BM 3/11. No BM overnight. All questions answered. See MAR and flowsheets for additional information.

## 2025-03-12 NOTE — PROGRESS NOTES
NURSING DISCHARGE NOTE    Discharged Home via Ambulatory.  Accompanied by RN  Belongings Taken by patient/family.  PIV discontinued  AVS reviewed  Enocuraged pateint to follow up with PCP regarding left arm pain

## 2025-03-14 NOTE — PAYOR COMM NOTE
--------------  DISCHARGE REVIEW    Payor: [employer]  Subscriber #:  [id number]  Authorization Number: [id number]    Admit date: N/A  Admit time:  N/A  Discharge Date: 3/12/2025  5:03 PM     Admitting Physician: Tamia Corbin MD  Attending Physician:  No att. providers found  Primary Care Physician: Anny Roblero MD       Discharge Summary Notes    No notes of this type exist for this encounter.         REVIEWER COMMENTS    STATUS IS OUTPT IN A BED

## 2025-03-18 NOTE — DISCHARGE SUMMARY
[facility]     Radha Ruiz Patient Status:  Outpatient in a Bed    1990 MRN [id number]   Location [facility] 0SW-A Attending No att. providers found   Hosp Day # 0 PCP Anny Roblero MD     Date of Admission: 3/11/2025  Date of Discharge:  3/12/2025     Discharge Disposition: Home or Self Care    Discharge Diagnosis:  Muskuloskeletal cp  Morbid obesity bmi 46 wt loss advised  Mild anemia  -r/o iron deficiency  Hypokalemia      History of Present Illness: 34 year old female with past medical history presents the emergency room with chest pain that started this morning when she woke up.  Was initially mild radiating to the left arm.  Patient initially thought she just slept in a weird position however as the day went on the pain intensified.  Patient reports feeling short of breath but denies any sweating.  No nausea, vomiting.  No exacerbating factors or remitting factors.  Patient does have a grandfather who had a heart attack in his 50s.     Brief Synopsis: Patient was monitored on CTU floor, she was seen by cardiology, echo and stress test no evidence of ischemia.    Lace+ Score: 18  59-90 High Risk  29-58 Medium Risk  0-28   Low Risk       TCM Follow-Up Recommendation:  LACE < 29: Low Risk of readmission after discharge from the hospital. No TCM follow-up needed.      Procedures during hospitalization:       Incidental or significant findings and recommendations (brief descriptions):      Lab/Test results pending at Discharge:       Consultants:  cardiology    Discharge Medication List:     Discharge Medications        STOP taking these medications      ondansetron 4 MG Tbdp  Commonly known as: Zofran-ODT                 [place]:     Follow-up appointment:   Roberto Resendiz DO  [address]  [facility] [address]  [facility] [address] [phone]    Follow up in 2 week(s)  Our office will call to schedule appointment    Anny Roblero MD  [address] [facility] [address]  [facility]  [address]  [phone]    Follow up in 1 week(s)      Appointments for Next 30 Days 3/18/2025 - 2025      None            Vital signs:       Physical Exam:    General: No acute distress   Lungs: clear to auscultation  Cardiovascular: S1, S2  Abdomen: Soft      -----------------------------------------------------------------------------------------------  PATIENT DISCHARGE INSTRUCTIONS: See electronic chart    Brie Michael MD    Total time spent on discharge plannin minutes     The  [employer] Act makes medical notes like these available to patients in the interest of transparency. Please be advised this is a medical document. Medical documents are intended to carry relevant information, facts as evident, and the clinical opinion of the practitioner. The medical note is intended as peer to peer communication and may appear blunt or direct. It is written in medical language and may contain abbreviations or verbiage that are unfamiliar.

## (undated) NOTE — MR AVS SNAPSHOT
Michael Thomas  [address], [facility] [address]  [address] [place] [phone]               Thank you for choosing us for your health care visit with [facility], DO.   We are glad to serve you and happy to provide you with this Don’t eat while distracted and slow down. Avoid over sized portions. Don’t eat while when you’re bored.      EAT THESE FOODS MORE OFTEN: EAT THESE FOODS LESS OFTEN:   Make half your plate fruits and vegetables Highly refined, white starches including wh